# Patient Record
Sex: FEMALE | Race: WHITE | NOT HISPANIC OR LATINO
[De-identification: names, ages, dates, MRNs, and addresses within clinical notes are randomized per-mention and may not be internally consistent; named-entity substitution may affect disease eponyms.]

---

## 2023-07-19 ENCOUNTER — APPOINTMENT (OUTPATIENT)
Dept: PLASTIC SURGERY | Facility: CLINIC | Age: 63
End: 2023-07-19

## 2023-07-19 PROBLEM — Z00.00 ENCOUNTER FOR PREVENTIVE HEALTH EXAMINATION: Status: ACTIVE | Noted: 2023-07-19

## 2024-03-14 ENCOUNTER — APPOINTMENT (OUTPATIENT)
Dept: PLASTIC SURGERY | Facility: CLINIC | Age: 64
End: 2024-03-14

## 2024-03-14 ENCOUNTER — OUTPATIENT (OUTPATIENT)
Dept: OUTPATIENT SERVICES | Facility: HOSPITAL | Age: 64
LOS: 1 days | End: 2024-03-14

## 2024-03-14 DIAGNOSIS — F41.9 ANXIETY DISORDER, UNSPECIFIED: ICD-10-CM

## 2024-03-14 DIAGNOSIS — E03.9 HYPOTHYROIDISM, UNSPECIFIED: ICD-10-CM

## 2024-03-14 DIAGNOSIS — G35 MULTIPLE SCLEROSIS: ICD-10-CM

## 2024-03-15 VITALS
TEMPERATURE: 98.8 F | SYSTOLIC BLOOD PRESSURE: 116 MMHG | DIASTOLIC BLOOD PRESSURE: 84 MMHG | HEART RATE: 73 BPM | BODY MASS INDEX: 30.31 KG/M2 | WEIGHT: 200 LBS | RESPIRATION RATE: 14 BRPM | HEIGHT: 68 IN

## 2024-03-15 DIAGNOSIS — Z01.89 ENCOUNTER FOR OTHER SPECIFIED SPECIAL EXAMINATIONS: ICD-10-CM

## 2024-03-15 PROBLEM — F41.9 ANXIETY: Status: ACTIVE | Noted: 2024-03-15

## 2024-03-15 PROBLEM — E03.9 HYPOTHYROIDISM, UNSPECIFIED TYPE: Status: ACTIVE | Noted: 2024-03-15

## 2024-03-15 PROBLEM — G35 MULTIPLE SCLEROSIS: Status: ACTIVE | Noted: 2024-03-15

## 2024-04-10 ENCOUNTER — APPOINTMENT (OUTPATIENT)
Dept: PLASTIC SURGERY | Facility: CLINIC | Age: 64
End: 2024-04-10

## 2024-05-08 NOTE — ASSESSMENT
[FreeTextEntry1] : Exam:  Full face and neck with significant bilateral jowling, skin laxity and subcutaneous adiposity of the neck, obtuse cervicomental angle, midface deflation and descent  A/P: EMILY PINEDA is a 63 year old female patient with unsatisfactory cosmetic appearance of the lower face and neck. We discussed face and neck lift to address her concerns, but given the advanced nature of her MS we will need neurology clearance for the required general anesthesia. The risks of surgery were discussed with the patient including but not limited to unsatisfactory cosmetic appearance, infection, bleeding, fluid collection, delayed wound healing, and temporary or permanent nerve injury. All questions and concerns were addressed to her satisfaction. She will be seeing her neurologist in the coming weeks and will follow up with our clinic regarding clearance and a decision on surgery.

## 2024-05-08 NOTE — HISTORY OF PRESENT ILLNESS
[FreeTextEntry1] : EMILY PINEDA is a 63 year old F with PMHx multiple sclerosis and hypothyroidism who presents in consultation to discuss aesthetic concerns of the face and neck. The patient is specifically concerned with excess skin and subcutaneous fat of the neck as well as bilateral jowling. The patient has no prior history of facial surgery or nonsurgical treatments. She has no prior history of abnormal bleeding, blood clotting, or adverse reactions to anesthesia. She is a nonsmoker.